# Patient Record
Sex: FEMALE | Race: WHITE | Employment: FULL TIME | URBAN - METROPOLITAN AREA
[De-identification: names, ages, dates, MRNs, and addresses within clinical notes are randomized per-mention and may not be internally consistent; named-entity substitution may affect disease eponyms.]

---

## 2017-10-29 ENCOUNTER — APPOINTMENT (OUTPATIENT)
Dept: ULTRASOUND IMAGING | Facility: HOSPITAL | Age: 62
DRG: 760 | End: 2017-10-29
Attending: EMERGENCY MEDICINE
Payer: COMMERCIAL

## 2017-10-29 ENCOUNTER — HOSPITAL ENCOUNTER (INPATIENT)
Facility: HOSPITAL | Age: 62
LOS: 3 days | Discharge: HOME | DRG: 760 | End: 2017-11-01
Attending: EMERGENCY MEDICINE | Admitting: HOSPITALIST
Payer: COMMERCIAL

## 2017-10-29 ENCOUNTER — APPOINTMENT (OUTPATIENT)
Dept: CT IMAGING | Facility: HOSPITAL | Age: 62
DRG: 760 | End: 2017-10-29
Attending: EMERGENCY MEDICINE
Payer: COMMERCIAL

## 2017-10-29 DIAGNOSIS — N83.8 OVARIAN MASS: Primary | ICD-10-CM

## 2017-10-29 DIAGNOSIS — R10.30 LOWER ABDOMINAL PAIN: ICD-10-CM

## 2017-10-29 PROCEDURE — 85025 COMPLETE CBC W/AUTO DIFF WBC: CPT | Performed by: EMERGENCY MEDICINE

## 2017-10-29 PROCEDURE — 85025 COMPLETE CBC W/AUTO DIFF WBC: CPT

## 2017-10-29 PROCEDURE — 76856 US EXAM PELVIC COMPLETE: CPT | Performed by: EMERGENCY MEDICINE

## 2017-10-29 PROCEDURE — 96374 THER/PROPH/DIAG INJ IV PUSH: CPT

## 2017-10-29 PROCEDURE — 74177 CT ABD & PELVIS W/CONTRAST: CPT | Performed by: EMERGENCY MEDICINE

## 2017-10-29 PROCEDURE — 80053 COMPREHEN METABOLIC PANEL: CPT

## 2017-10-29 PROCEDURE — 76830 TRANSVAGINAL US NON-OB: CPT | Performed by: EMERGENCY MEDICINE

## 2017-10-29 PROCEDURE — 93976 VASCULAR STUDY: CPT | Performed by: EMERGENCY MEDICINE

## 2017-10-29 PROCEDURE — 99285 EMERGENCY DEPT VISIT HI MDM: CPT

## 2017-10-29 PROCEDURE — 80053 COMPREHEN METABOLIC PANEL: CPT | Performed by: EMERGENCY MEDICINE

## 2017-10-29 RX ORDER — ACETAMINOPHEN 325 MG/1
650 TABLET ORAL EVERY 4 HOURS PRN
Status: DISCONTINUED | OUTPATIENT
Start: 2017-10-29 | End: 2017-11-01

## 2017-10-29 RX ORDER — HYDROCODONE BITARTRATE AND ACETAMINOPHEN 5; 325 MG/1; MG/1
1 TABLET ORAL EVERY 4 HOURS PRN
Status: DISCONTINUED | OUTPATIENT
Start: 2017-10-29 | End: 2017-11-01

## 2017-10-29 RX ORDER — MORPHINE SULFATE 4 MG/ML
4 INJECTION, SOLUTION INTRAMUSCULAR; INTRAVENOUS ONCE
Status: COMPLETED | OUTPATIENT
Start: 2017-10-29 | End: 2017-10-29

## 2017-10-29 RX ORDER — ENOXAPARIN SODIUM 100 MG/ML
40 INJECTION SUBCUTANEOUS DAILY
Status: DISCONTINUED | OUTPATIENT
Start: 2017-10-29 | End: 2017-11-01

## 2017-10-29 RX ORDER — SODIUM CHLORIDE 0.9 % (FLUSH) 0.9 %
3 SYRINGE (ML) INJECTION AS NEEDED
Status: DISCONTINUED | OUTPATIENT
Start: 2017-10-29 | End: 2017-11-01

## 2017-10-29 RX ORDER — MORPHINE SULFATE 4 MG/ML
INJECTION, SOLUTION INTRAMUSCULAR; INTRAVENOUS
Status: COMPLETED
Start: 2017-10-29 | End: 2017-10-29

## 2017-10-29 RX ORDER — HYDROCODONE BITARTRATE AND ACETAMINOPHEN 5; 325 MG/1; MG/1
2 TABLET ORAL EVERY 4 HOURS PRN
Status: DISCONTINUED | OUTPATIENT
Start: 2017-10-29 | End: 2017-11-01

## 2017-10-29 RX ORDER — ONDANSETRON 2 MG/ML
4 INJECTION INTRAMUSCULAR; INTRAVENOUS EVERY 6 HOURS PRN
Status: DISCONTINUED | OUTPATIENT
Start: 2017-10-29 | End: 2017-11-01

## 2017-10-30 ENCOUNTER — APPOINTMENT (OUTPATIENT)
Dept: MAMMOGRAPHY | Facility: HOSPITAL | Age: 62
DRG: 760 | End: 2017-10-30
Attending: OBSTETRICS & GYNECOLOGY
Payer: COMMERCIAL

## 2017-10-30 PROCEDURE — 80053 COMPREHEN METABOLIC PANEL: CPT | Performed by: HOSPITALIST

## 2017-10-30 PROCEDURE — 87086 URINE CULTURE/COLONY COUNT: CPT | Performed by: NURSE PRACTITIONER

## 2017-10-30 PROCEDURE — 84132 ASSAY OF SERUM POTASSIUM: CPT | Performed by: HOSPITALIST

## 2017-10-30 PROCEDURE — 87186 SC STD MICRODIL/AGAR DIL: CPT | Performed by: NURSE PRACTITIONER

## 2017-10-30 PROCEDURE — 88175 CYTOPATH C/V AUTO FLUID REDO: CPT | Performed by: OBSTETRICS & GYNECOLOGY

## 2017-10-30 PROCEDURE — 85025 COMPLETE CBC W/AUTO DIFF WBC: CPT | Performed by: HOSPITALIST

## 2017-10-30 PROCEDURE — 83036 HEMOGLOBIN GLYCOSYLATED A1C: CPT | Performed by: NURSE PRACTITIONER

## 2017-10-30 PROCEDURE — 87507 IADNA-DNA/RNA PROBE TQ 12-25: CPT | Performed by: NURSE PRACTITIONER

## 2017-10-30 PROCEDURE — 87624 HPV HI-RISK TYP POOLED RSLT: CPT | Performed by: OBSTETRICS & GYNECOLOGY

## 2017-10-30 PROCEDURE — 84466 ASSAY OF TRANSFERRIN: CPT | Performed by: NURSE PRACTITIONER

## 2017-10-30 PROCEDURE — 81001 URINALYSIS AUTO W/SCOPE: CPT | Performed by: NURSE PRACTITIONER

## 2017-10-30 PROCEDURE — 82272 OCCULT BLD FECES 1-3 TESTS: CPT | Performed by: NURSE PRACTITIONER

## 2017-10-30 PROCEDURE — 86304 IMMUNOASSAY TUMOR CA 125: CPT | Performed by: OBSTETRICS & GYNECOLOGY

## 2017-10-30 PROCEDURE — 83540 ASSAY OF IRON: CPT | Performed by: NURSE PRACTITIONER

## 2017-10-30 PROCEDURE — 77066 DX MAMMO INCL CAD BI: CPT | Performed by: OBSTETRICS & GYNECOLOGY

## 2017-10-30 PROCEDURE — 87077 CULTURE AEROBIC IDENTIFY: CPT | Performed by: NURSE PRACTITIONER

## 2017-10-30 PROCEDURE — BH02ZZZ PLAIN RADIOGRAPHY OF BILATERAL BREASTS: ICD-10-PCS | Performed by: RADIOLOGY

## 2017-10-30 RX ORDER — POTASSIUM CHLORIDE 20 MEQ/1
40 TABLET, EXTENDED RELEASE ORAL EVERY 4 HOURS
Status: DISPENSED | OUTPATIENT
Start: 2017-10-30 | End: 2017-10-30

## 2017-10-30 RX ORDER — SUMATRIPTAN 25 MG/1
50 TABLET, FILM COATED ORAL EVERY 2 HOUR PRN
Status: DISCONTINUED | OUTPATIENT
Start: 2017-10-30 | End: 2017-11-01

## 2017-10-30 RX ORDER — MELATONIN
325 2 TIMES DAILY WITH MEALS
Status: DISCONTINUED | OUTPATIENT
Start: 2017-10-30 | End: 2017-11-01

## 2017-10-30 RX ORDER — SODIUM CHLORIDE 9 MG/ML
INJECTION, SOLUTION INTRAVENOUS
Status: COMPLETED
Start: 2017-10-30 | End: 2017-10-30

## 2017-10-30 NOTE — ED PROVIDER NOTES
Patient Seen in: [facility] AND [facility] Emergency Department    History   Patient presents with:  Abdomen/Flank Pain (GI/)    Stated Complaint: Lower abdominal pain for 2 days    HPI    Patient is a 79-year-old female who presents to the emergency departmen Abdominal: Normal appearance. She exhibits no fluid wave and no ascites. There is tenderness in the suprapubic area and left lower quadrant. There is guarding. There is no rigidity, no rebound and no CVA tenderness.        Musculoskeletal: Normal range of m Urine Culture >100,000 CFU/ML Klebsiella pneumoniae (*)     All other components within normal limits   CBC W/ DIFFERENTIAL - Abnormal; Notable for the following:     HGB 11.8 (*)     HCT 34.6 (*)     Neutrophil Absolute 9.3 (*)     Lymphocyte Absolute 0. Procedure                               Abnormality         Status                     ---------                               -----------         ------                     CBC W/ DIFFERENTIAL[[id number]]          Abnormal            Final result There are no discharge medications for this patient.       Present on Admission           ICD-10-CM Noted POA    * (Principal)Ovarian mass N83.9 10/29/2017 Unknown    Lower abdominal pain R10.30 10/29/2017

## 2017-10-30 NOTE — H&P
[facility] Hospitalist Team  History and Physical     ASSESSMENT / PLAN:   63 yo female with hx migraines who presents with  abdominal pain and dark stools.   CT with  Pelvic mass, US with 11 cm right mass, ob/gyn and gyn/onc eval. GI consulted with dark stools, a migraines who presents with  abdominal pain and dark stools. CT with  Pelvic mass, US with 11 cm right mass, ob/gyn and gyn/onc eval. GI consulted with dark stools, anemia and ? Colitis on CT    Pt has not seen a doctor in [place]".  She noted predominate HGB  11.8*  11.3*   MCV  92.6  92.5   PLT  211  182       Recent Labs   Lab  10/29/17   1608  10/30/17   0537   NA  135*  135*   K  3.7  3.4   CL  102  103   CO2  24  26   BUN  10  8   CREATSERUM  0.66  0.74   GLU  131*  119*   CA  8.9  8.6       Recent report was submitted and there is agreement without major discrepancies. SEE ATTENDING NOTE BELOW      Patient seen and examined independently. Discussed with APN and agree with note above.       HPI : Ms. Owen Weiner is a 63 y/o F with a hx of migr infection. Redundant transverse colon with a moderate amount of stool present. Unremarkable right colon and appendix.  Wall thickening of the mid to distal descending and sigmoid colon, nonspecific and may reflect underdistention although I can't entirely

## 2017-10-30 NOTE — CONSULTS
61 y/o ,  over 10 years, no HRT admitted with abdominal pain from ER. Pt reports diffuse abd pain and cramping for last 2 days, black, tarry  stool for 2 days. No fever, N, V. No vag bleeding. Reports mild urinary frequency for 6 months.   CT :

## 2017-10-30 NOTE — CONSULTS
[place] [facility] - [facility]    Report of Consultation    Stephany Manasa Patient Status:  Inpatient    1955 MRN [id number]   Location [facility] 4W/SW/SE Attending Thea Barr, [address] [facility] Day # 1 PCP None Pcp     Reason for Consultation:  Ab injection 4 mg, 4 mg, Intravenous, Q6H PRN    Review of Systems: 10 point ROS discussed.  Positives include that which is stated in HPI    Physical Exam:    /59 (BP Location: Right arm)   Pulse 83   Temp 98.8 °F (37.1 °C) (Oral)   Resp 18   Ht 5' 6\" Only)(cpt=74177)    Result Date: 10/29/2017  CONCLUSION:  1. Large cystic pelvic mass of probable ovarian origin. Unremarkable uterus. Impossible to distinguish right versus left ovary based on this study.  Recommend followup pelvic ultrasound/transvaginal loss.           Trend Hgb           Gyne consult           Check CA -125  Adam Biggs MD  10/30/2017  11:19 AM

## 2017-10-31 ENCOUNTER — SURGERY (OUTPATIENT)
Age: 62
End: 2017-10-31

## 2017-10-31 PROCEDURE — 82728 ASSAY OF FERRITIN: CPT | Performed by: NURSE PRACTITIONER

## 2017-10-31 PROCEDURE — 80048 BASIC METABOLIC PNL TOTAL CA: CPT | Performed by: NURSE PRACTITIONER

## 2017-10-31 PROCEDURE — 93005 ELECTROCARDIOGRAM TRACING: CPT

## 2017-10-31 PROCEDURE — 85025 COMPLETE CBC W/AUTO DIFF WBC: CPT | Performed by: NURSE PRACTITIONER

## 2017-10-31 PROCEDURE — 93010 ELECTROCARDIOGRAM REPORT: CPT | Performed by: NURSE PRACTITIONER

## 2017-10-31 PROCEDURE — 0DJD8ZZ INSPECTION OF LOWER INTESTINAL TRACT, VIA NATURAL OR ARTIFICIAL OPENING ENDOSCOPIC: ICD-10-PCS | Performed by: INTERNAL MEDICINE

## 2017-10-31 RX ORDER — MIDAZOLAM HYDROCHLORIDE 1 MG/ML
INJECTION INTRAMUSCULAR; INTRAVENOUS
Status: DISCONTINUED | OUTPATIENT
Start: 2017-10-31 | End: 2017-10-31 | Stop reason: HOSPADM

## 2017-10-31 NOTE — PROGRESS NOTES
Gil Schultz  [id number]  October 31, 2017  7:24 AM      GYN Progress Note    Subjective: Pain mild, no need for meds since admit  Objective:  BP 94/50 (BP Location: Right arm)   Pulse 86   Temp 98.8 °F (37.1 °C) (Oral)   Resp 18   Ht 5' 6\" (1.676 m)

## 2017-10-31 NOTE — PROGRESS NOTES
[facility] Hospitalist Team  Progress Note    Hernandez Baidevon Patient Status:  Inpatient    1955 MRN [id number]   Location [facility] 4W/SW/SE Attending Stefan Mo, DO   Hosp Day # 2 PCP None Pcp     CC: Follow Up  PCP: None Pcp    ASSESSMENT/PL [phone]  10/31/2017    SUBJECTIVE:   Groggy from colonoscopy. Wants to eat      OBJECTIVE:   Blood pressure 103/63, pulse 86, temperature 97.9 °F (36.6 °C), temperature source Oral, resp.  rate 18, height 167.6 cm (5' 6\"), weight 155 lb 3.3 oz (70.4 k IMAGING     Ct Abdomen+pelvis(contrast Only)(cpt=74177)    Result Date: 10/29/2017  CONCLUSION:  1. Large cystic pelvic mass of probable ovarian origin. Unremarkable uterus. Impossible to distinguish right versus left ovary based on this study.  Rec SUSPICIOUS PALPABLE LUMP SHOULD BE BIOPSIED. For patients over the age of 36, the target due date for the patient's next mammogram has been entered into a reminder system.    Patient received a discharge summary from the technologist after completion of e sulfate  -appreciate GI input     Klebsiella UTI  -UA abnl, culture with klebsiella,sensitivities pending  -ceftriaxone D2      Elevated BS  -A1C 5.5     Migraines  -prn tylenol and imitrex  -will follow     HM  -pap with HPV negative  -mammogram negative

## 2017-10-31 NOTE — OPERATIVE REPORT
[facility] - [facility]    Colonoscopy Report      Laura Cochran Patient Status:  Inpatient    1955 MRN [id number]   Location [facility] ENDOSCOPY LAB SUITES Attending Hardeep Bridges DO       DATE OF OPERATION:  10/31/2017     REFERRING

## 2017-10-31 NOTE — PRE-SEDATION ASSESSMENT
Physician Pre-Sedation Assessment    Pre-Sedation Assessment:    Sedation History: None    Cardiac: normal S1, S2  Respiratory: breath sounds clear bilaterally   Abdomen: soft, BS (+), non-tender    ASA Classification: II    Plan: IV Sedation

## 2017-10-31 NOTE — H&P
[address] Patient Status:  Inpatient    1955 MRN [id number]   [facility] ENDOSCOPY LAB SUITES Attending Al Gandara, [address] [facility] Day # 2 PCP None Pcp     Date:  10/31/2017  D

## 2017-11-01 VITALS
HEIGHT: 66 IN | SYSTOLIC BLOOD PRESSURE: 109 MMHG | BODY MASS INDEX: 24.94 KG/M2 | DIASTOLIC BLOOD PRESSURE: 59 MMHG | WEIGHT: 155.19 LBS | HEART RATE: 78 BPM | TEMPERATURE: 99 F | RESPIRATION RATE: 18 BRPM | OXYGEN SATURATION: 95 %

## 2017-11-01 PROBLEM — R10.30 LOWER ABDOMINAL PAIN: Status: RESOLVED | Noted: 2017-10-29 | Resolved: 2017-11-01

## 2017-11-01 RX ORDER — MELATONIN
325 2 TIMES DAILY WITH MEALS
Qty: 60 TABLET | Refills: 0 | Status: SHIPPED | OUTPATIENT
Start: 2017-11-01 | End: 2021-11-12

## 2017-11-01 RX ORDER — ASPIRIN 81 MG
100 TABLET, DELAYED RELEASE (ENTERIC COATED) ORAL 2 TIMES DAILY
Qty: 60 TABLET | Refills: 0 | Status: SHIPPED | OUTPATIENT
Start: 2017-11-01 | End: 2021-11-12

## 2017-11-01 RX ORDER — ACETAMINOPHEN 325 MG/1
650 TABLET ORAL EVERY 4 HOURS PRN
Qty: 30 TABLET | Refills: 0 | Status: SHIPPED | OUTPATIENT
Start: 2017-11-01 | End: 2021-11-12

## 2017-11-01 NOTE — PROGRESS NOTES
GYN Note    Pt not using any pain meds, though has not walked in halls. GYN-ONC not avail until at least Sat for surg, so pt may be discharged.   I explained surg procedure to pt, necessary time off work post-op, etc.  Lives with husb who can assist her

## 2017-11-01 NOTE — DISCHARGE SUMMARY
[facility] Hospitalist Discharge Summary   Patient ID:  Bre Perez  [id number]  27 year old  12/4/1955    Admit date: 10/29/2017  Discharge date: 11/1/2017  Risk of Readmission Lace+ Score: 17  59-90 High Risk  29-58 Medium Risk  0-28   Low Risk    Primary C will schedule with Dr Satnam Sterling [facility] Gyn/Onc)  -pre op clearance- denies CP or SOB. Able to perform >4 mets of exercise.  EKG pending, if normal no contraindications to OR       Anemia/Black Stools  -hgb on admit 11.8-->11.6  -iron 22, iron sat 9 transferri underdistention although I can't entirely exclude  colitis. Given history of black stools, consider further evaluation/assessment. No evidence of acute diverticulitis based on this study; no perforation, free air or abscess.          Us Pelvis Ev (trns Abd call you for surgery date    Disposition: home  Discharged Condition: good      Additional patient instructions:  Dr Luna Osei and Dr Barber Nails to do operation.  Dr Luna Osei office to call you for surgery date     Establish with Dr Blanca Stubbs as primary after operation

## 2017-11-01 NOTE — DISCHARGE SUMMARY
[facility] Hospitalist Discharge Summary   Patient ID:  Smith Claude  [id number]  50 year old  12/4/1955    Admit date: 10/29/2017  Discharge date: 11/1/2017  Risk of Readmission Lace+ Score: 17  59-90 High Risk  29-58 Medium Risk  0-28   Low Risk    Primary C for surgery Saturday or next week, OB will schedule with Dr Marc Dias [facility] Gyn/Onc). OB office to call pt with date/time  -pre op clearance- denies CP or SOB. Able to perform >4 mets of exercise.  EKG pending, if normal no contraindications to OR     Anemia descending and sigmoid colon, nonspecific and may reflect underdistention although I can't entirely exclude  colitis. Given history of black stools, consider further evaluation/assessment.  No evidence of acute diverticulitis based on this study; no perfora surgery  Dr Claudine Garcia and Dr Michael Malloy to do operation. Dr Claudine Garcia office to call you for surgery date    Disposition: home  Discharged Condition: good      Additional patient instructions:  Dr Claudine Garcia and Dr Michael Malloy to do operation.  Dr Claudine Garcia office to call you for surger

## 2017-11-04 RX ORDER — OMEGA-3 FATTY ACIDS/FISH OIL 300-1000MG
400 CAPSULE ORAL 4 TIMES DAILY PRN
COMMUNITY
End: 2021-11-12

## 2017-11-04 RX ORDER — SUMATRIPTAN 50 MG/1
50 TABLET, FILM COATED ORAL EVERY 2 HOUR PRN
COMMUNITY
End: 2021-11-12

## 2017-11-09 ENCOUNTER — LAB ENCOUNTER (OUTPATIENT)
Dept: LAB | Age: 62
End: 2017-11-09
Attending: OBSTETRICS & GYNECOLOGY
Payer: COMMERCIAL

## 2017-11-09 DIAGNOSIS — Z01.818 PREOPERATIVE TESTING: ICD-10-CM

## 2017-11-09 PROCEDURE — 86901 BLOOD TYPING SEROLOGIC RH(D): CPT

## 2017-11-09 PROCEDURE — 86900 BLOOD TYPING SEROLOGIC ABO: CPT

## 2017-11-09 PROCEDURE — 36415 COLL VENOUS BLD VENIPUNCTURE: CPT

## 2017-11-09 PROCEDURE — 86850 RBC ANTIBODY SCREEN: CPT

## 2017-11-10 ENCOUNTER — ANESTHESIA (OUTPATIENT)
Dept: SURGERY | Facility: HOSPITAL | Age: 62
DRG: 743 | End: 2017-11-10
Payer: COMMERCIAL

## 2017-11-10 ENCOUNTER — ANESTHESIA EVENT (OUTPATIENT)
Dept: SURGERY | Facility: HOSPITAL | Age: 62
DRG: 743 | End: 2017-11-10
Payer: COMMERCIAL

## 2017-11-10 ENCOUNTER — HOSPITAL ENCOUNTER (INPATIENT)
Facility: HOSPITAL | Age: 62
LOS: 3 days | Discharge: HOME | DRG: 743 | End: 2017-11-13
Attending: OBSTETRICS & GYNECOLOGY | Admitting: OBSTETRICS & GYNECOLOGY
Payer: COMMERCIAL

## 2017-11-10 ENCOUNTER — SURGERY (OUTPATIENT)
Age: 62
End: 2017-11-10

## 2017-11-10 DIAGNOSIS — Z01.818 PREOPERATIVE TESTING: Primary | ICD-10-CM

## 2017-11-10 PROBLEM — Z90.710 S/P ABDOMINAL HYSTERECTOMY: Status: ACTIVE | Noted: 2017-11-10

## 2017-11-10 PROCEDURE — 0UT20ZZ RESECTION OF BILATERAL OVARIES, OPEN APPROACH: ICD-10-PCS | Performed by: OBSTETRICS & GYNECOLOGY

## 2017-11-10 PROCEDURE — 0DNW0ZZ RELEASE PERITONEUM, OPEN APPROACH: ICD-10-PCS | Performed by: OBSTETRICS & GYNECOLOGY

## 2017-11-10 PROCEDURE — 0UT90ZZ RESECTION OF UTERUS, OPEN APPROACH: ICD-10-PCS | Performed by: OBSTETRICS & GYNECOLOGY

## 2017-11-10 PROCEDURE — 88112 CYTOPATH CELL ENHANCE TECH: CPT | Performed by: OBSTETRICS & GYNECOLOGY

## 2017-11-10 PROCEDURE — 88305 TISSUE EXAM BY PATHOLOGIST: CPT | Performed by: OBSTETRICS & GYNECOLOGY

## 2017-11-10 PROCEDURE — 0UT70ZZ RESECTION OF BILATERAL FALLOPIAN TUBES, OPEN APPROACH: ICD-10-PCS | Performed by: OBSTETRICS & GYNECOLOGY

## 2017-11-10 PROCEDURE — 88307 TISSUE EXAM BY PATHOLOGIST: CPT | Performed by: OBSTETRICS & GYNECOLOGY

## 2017-11-10 PROCEDURE — 88332 PATH CONSLTJ SURG EA ADD BLK: CPT | Performed by: OBSTETRICS & GYNECOLOGY

## 2017-11-10 PROCEDURE — 0UB00ZZ EXCISION OF RIGHT OVARY, OPEN APPROACH: ICD-10-PCS | Performed by: OBSTETRICS & GYNECOLOGY

## 2017-11-10 PROCEDURE — 88331 PATH CONSLTJ SURG 1 BLK 1SPC: CPT | Performed by: OBSTETRICS & GYNECOLOGY

## 2017-11-10 RX ORDER — HYDROCODONE BITARTRATE AND ACETAMINOPHEN 5; 325 MG/1; MG/1
2 TABLET ORAL AS NEEDED
Status: DISCONTINUED | OUTPATIENT
Start: 2017-11-10 | End: 2017-11-11

## 2017-11-10 RX ORDER — ONDANSETRON 2 MG/ML
INJECTION INTRAMUSCULAR; INTRAVENOUS AS NEEDED
Status: DISCONTINUED | OUTPATIENT
Start: 2017-11-10 | End: 2017-11-10 | Stop reason: SURG

## 2017-11-10 RX ORDER — HYDROMORPHONE HYDROCHLORIDE 1 MG/ML
0.6 INJECTION, SOLUTION INTRAMUSCULAR; INTRAVENOUS; SUBCUTANEOUS EVERY 5 MIN PRN
Status: DISCONTINUED | OUTPATIENT
Start: 2017-11-10 | End: 2017-11-11

## 2017-11-10 RX ORDER — NALOXONE HYDROCHLORIDE 0.4 MG/ML
0.08 INJECTION, SOLUTION INTRAMUSCULAR; INTRAVENOUS; SUBCUTANEOUS
Status: DISCONTINUED | OUTPATIENT
Start: 2017-11-10 | End: 2017-11-12

## 2017-11-10 RX ORDER — FAMOTIDINE 20 MG/1
20 TABLET ORAL ONCE
Status: DISCONTINUED | OUTPATIENT
Start: 2017-11-10 | End: 2017-11-10 | Stop reason: HOSPADM

## 2017-11-10 RX ORDER — ONDANSETRON 2 MG/ML
4 INJECTION INTRAMUSCULAR; INTRAVENOUS ONCE AS NEEDED
Status: ACTIVE | OUTPATIENT
Start: 2017-11-10 | End: 2017-11-10

## 2017-11-10 RX ORDER — METOCLOPRAMIDE 10 MG/1
10 TABLET ORAL ONCE
Status: DISCONTINUED | OUTPATIENT
Start: 2017-11-10 | End: 2017-11-10 | Stop reason: HOSPADM

## 2017-11-10 RX ORDER — DEXTROSE, SODIUM CHLORIDE, SODIUM LACTATE, POTASSIUM CHLORIDE, AND CALCIUM CHLORIDE 5; .6; .31; .03; .02 G/100ML; G/100ML; G/100ML; G/100ML; G/100ML
INJECTION, SOLUTION INTRAVENOUS CONTINUOUS
Status: DISCONTINUED | OUTPATIENT
Start: 2017-11-10 | End: 2017-11-12

## 2017-11-10 RX ORDER — SODIUM CHLORIDE 9 MG/ML
INJECTION, SOLUTION INTRAVENOUS CONTINUOUS PRN
Status: DISCONTINUED | OUTPATIENT
Start: 2017-11-10 | End: 2017-11-10 | Stop reason: SURG

## 2017-11-10 RX ORDER — SODIUM CHLORIDE 0.9 % (FLUSH) 0.9 %
10 SYRINGE (ML) INJECTION AS NEEDED
Status: DISCONTINUED | OUTPATIENT
Start: 2017-11-10 | End: 2017-11-13

## 2017-11-10 RX ORDER — ROCURONIUM BROMIDE 10 MG/ML
INJECTION, SOLUTION INTRAVENOUS AS NEEDED
Status: DISCONTINUED | OUTPATIENT
Start: 2017-11-10 | End: 2017-11-10 | Stop reason: SURG

## 2017-11-10 RX ORDER — GLYCOPYRROLATE 0.2 MG/ML
INJECTION INTRAMUSCULAR; INTRAVENOUS AS NEEDED
Status: DISCONTINUED | OUTPATIENT
Start: 2017-11-10 | End: 2017-11-10 | Stop reason: SURG

## 2017-11-10 RX ORDER — HYDROCODONE BITARTRATE AND ACETAMINOPHEN 5; 325 MG/1; MG/1
1 TABLET ORAL AS NEEDED
Status: DISCONTINUED | OUTPATIENT
Start: 2017-11-10 | End: 2017-11-11

## 2017-11-10 RX ORDER — HALOPERIDOL 5 MG/ML
0.25 INJECTION INTRAMUSCULAR ONCE AS NEEDED
Status: ACTIVE | OUTPATIENT
Start: 2017-11-10 | End: 2017-11-10

## 2017-11-10 RX ORDER — ACETAMINOPHEN 325 MG/1
650 TABLET ORAL EVERY 4 HOURS PRN
Status: DISCONTINUED | OUTPATIENT
Start: 2017-11-10 | End: 2017-11-13

## 2017-11-10 RX ORDER — HYDROMORPHONE HYDROCHLORIDE 1 MG/ML
0.4 INJECTION, SOLUTION INTRAMUSCULAR; INTRAVENOUS; SUBCUTANEOUS EVERY 5 MIN PRN
Status: DISCONTINUED | OUTPATIENT
Start: 2017-11-10 | End: 2017-11-11

## 2017-11-10 RX ORDER — NALOXONE HYDROCHLORIDE 0.4 MG/ML
80 INJECTION, SOLUTION INTRAMUSCULAR; INTRAVENOUS; SUBCUTANEOUS AS NEEDED
Status: ACTIVE | OUTPATIENT
Start: 2017-11-10 | End: 2017-11-10

## 2017-11-10 RX ORDER — ONDANSETRON 2 MG/ML
4 INJECTION INTRAMUSCULAR; INTRAVENOUS EVERY 6 HOURS PRN
Status: DISCONTINUED | OUTPATIENT
Start: 2017-11-10 | End: 2017-11-13

## 2017-11-10 RX ORDER — HEPARIN SODIUM 5000 [USP'U]/ML
5000 INJECTION, SOLUTION INTRAVENOUS; SUBCUTANEOUS EVERY 8 HOURS SCHEDULED
Status: DISCONTINUED | OUTPATIENT
Start: 2017-11-11 | End: 2017-11-13

## 2017-11-10 RX ORDER — HYDROCODONE BITARTRATE AND ACETAMINOPHEN 5; 325 MG/1; MG/1
1 TABLET ORAL EVERY 4 HOURS PRN
Status: DISCONTINUED | OUTPATIENT
Start: 2017-11-10 | End: 2017-11-13

## 2017-11-10 RX ORDER — ONDANSETRON 4 MG/1
4 TABLET, FILM COATED ORAL EVERY 8 HOURS PRN
Status: DISCONTINUED | OUTPATIENT
Start: 2017-11-10 | End: 2017-11-13

## 2017-11-10 RX ORDER — LIDOCAINE HYDROCHLORIDE 10 MG/ML
INJECTION, SOLUTION EPIDURAL; INFILTRATION; INTRACAUDAL; PERINEURAL AS NEEDED
Status: DISCONTINUED | OUTPATIENT
Start: 2017-11-10 | End: 2017-11-10 | Stop reason: SURG

## 2017-11-10 RX ORDER — NEOSTIGMINE METHYLSULFATE 0.5 MG/ML
INJECTION INTRAVENOUS AS NEEDED
Status: DISCONTINUED | OUTPATIENT
Start: 2017-11-10 | End: 2017-11-10 | Stop reason: SURG

## 2017-11-10 RX ORDER — MORPHINE SULFATE 2 MG/ML
2 INJECTION, SOLUTION INTRAMUSCULAR; INTRAVENOUS EVERY 10 MIN PRN
Status: DISCONTINUED | OUTPATIENT
Start: 2017-11-10 | End: 2017-11-11

## 2017-11-10 RX ORDER — MORPHINE SULFATE 4 MG/ML
4 INJECTION, SOLUTION INTRAMUSCULAR; INTRAVENOUS EVERY 10 MIN PRN
Status: DISCONTINUED | OUTPATIENT
Start: 2017-11-10 | End: 2017-11-11

## 2017-11-10 RX ORDER — ONDANSETRON 2 MG/ML
4 INJECTION INTRAMUSCULAR; INTRAVENOUS EVERY 8 HOURS PRN
Status: DISCONTINUED | OUTPATIENT
Start: 2017-11-10 | End: 2017-11-13

## 2017-11-10 RX ORDER — MORPHINE SULFATE 10 MG/ML
6 INJECTION, SOLUTION INTRAMUSCULAR; INTRAVENOUS EVERY 10 MIN PRN
Status: DISCONTINUED | OUTPATIENT
Start: 2017-11-10 | End: 2017-11-11

## 2017-11-10 RX ORDER — MIDAZOLAM HYDROCHLORIDE 1 MG/ML
INJECTION INTRAMUSCULAR; INTRAVENOUS AS NEEDED
Status: DISCONTINUED | OUTPATIENT
Start: 2017-11-10 | End: 2017-11-10 | Stop reason: SURG

## 2017-11-10 RX ORDER — SUMATRIPTAN 25 MG/1
50 TABLET, FILM COATED ORAL EVERY 2 HOUR PRN
Status: DISCONTINUED | OUTPATIENT
Start: 2017-11-10 | End: 2017-11-13

## 2017-11-10 RX ORDER — HYDROMORPHONE HYDROCHLORIDE 1 MG/ML
0.2 INJECTION, SOLUTION INTRAMUSCULAR; INTRAVENOUS; SUBCUTANEOUS EVERY 5 MIN PRN
Status: DISCONTINUED | OUTPATIENT
Start: 2017-11-10 | End: 2017-11-11

## 2017-11-10 RX ORDER — SODIUM CHLORIDE, SODIUM LACTATE, POTASSIUM CHLORIDE, CALCIUM CHLORIDE 600; 310; 30; 20 MG/100ML; MG/100ML; MG/100ML; MG/100ML
INJECTION, SOLUTION INTRAVENOUS CONTINUOUS
Status: DISCONTINUED | OUTPATIENT
Start: 2017-11-10 | End: 2017-11-10

## 2017-11-10 RX ORDER — NALBUPHINE HCL 10 MG/ML
2.5 AMPUL (ML) INJECTION EVERY 4 HOURS PRN
Status: DISCONTINUED | OUTPATIENT
Start: 2017-11-10 | End: 2017-11-12

## 2017-11-10 RX ORDER — HYDROCODONE BITARTRATE AND ACETAMINOPHEN 5; 325 MG/1; MG/1
2 TABLET ORAL EVERY 4 HOURS PRN
Status: DISCONTINUED | OUTPATIENT
Start: 2017-11-10 | End: 2017-11-13

## 2017-11-10 RX ORDER — DEXAMETHASONE SODIUM PHOSPHATE 4 MG/ML
VIAL (ML) INJECTION AS NEEDED
Status: DISCONTINUED | OUTPATIENT
Start: 2017-11-10 | End: 2017-11-10 | Stop reason: SURG

## 2017-11-10 RX ORDER — ACETAMINOPHEN 500 MG
1000 TABLET ORAL ONCE
Status: DISCONTINUED | OUTPATIENT
Start: 2017-11-10 | End: 2017-11-10 | Stop reason: HOSPADM

## 2017-11-10 RX ORDER — SODIUM CHLORIDE, SODIUM LACTATE, POTASSIUM CHLORIDE, CALCIUM CHLORIDE 600; 310; 30; 20 MG/100ML; MG/100ML; MG/100ML; MG/100ML
INJECTION, SOLUTION INTRAVENOUS CONTINUOUS
Status: DISCONTINUED | OUTPATIENT
Start: 2017-11-10 | End: 2017-11-12

## 2017-11-10 RX ORDER — DIPHENHYDRAMINE HYDROCHLORIDE 50 MG/ML
12.5 INJECTION INTRAMUSCULAR; INTRAVENOUS EVERY 4 HOURS PRN
Status: DISCONTINUED | OUTPATIENT
Start: 2017-11-10 | End: 2017-11-12

## 2017-11-10 RX ORDER — HEPARIN SODIUM 5000 [USP'U]/ML
INJECTION, SOLUTION INTRAVENOUS; SUBCUTANEOUS AS NEEDED
Status: DISCONTINUED | OUTPATIENT
Start: 2017-11-10 | End: 2017-11-10 | Stop reason: SURG

## 2017-11-10 RX ADMIN — ROCURONIUM BROMIDE 50 MG: 10 INJECTION, SOLUTION INTRAVENOUS at 16:03:00

## 2017-11-10 RX ADMIN — GLYCOPYRROLATE 0.6 MG: 0.2 INJECTION INTRAMUSCULAR; INTRAVENOUS at 17:38:00

## 2017-11-10 RX ADMIN — NEOSTIGMINE METHYLSULFATE 3 MG: 0.5 INJECTION INTRAVENOUS at 17:38:00

## 2017-11-10 RX ADMIN — MIDAZOLAM HYDROCHLORIDE 2 MG: 1 INJECTION INTRAMUSCULAR; INTRAVENOUS at 16:03:00

## 2017-11-10 RX ADMIN — SODIUM CHLORIDE: 9 INJECTION, SOLUTION INTRAVENOUS at 16:07:00

## 2017-11-10 RX ADMIN — SODIUM CHLORIDE: 9 INJECTION, SOLUTION INTRAVENOUS at 17:50:00

## 2017-11-10 RX ADMIN — SODIUM CHLORIDE: 9 INJECTION, SOLUTION INTRAVENOUS at 17:00:00

## 2017-11-10 RX ADMIN — DEXAMETHASONE SODIUM PHOSPHATE 4 MG: 4 MG/ML VIAL (ML) INJECTION at 16:11:00

## 2017-11-10 RX ADMIN — HEPARIN SODIUM 5000 UNITS: 5000 INJECTION, SOLUTION INTRAVENOUS; SUBCUTANEOUS at 16:30:00

## 2017-11-10 RX ADMIN — SODIUM CHLORIDE, SODIUM LACTATE, POTASSIUM CHLORIDE, CALCIUM CHLORIDE: 600; 310; 30; 20 INJECTION, SOLUTION INTRAVENOUS at 16:02:00

## 2017-11-10 RX ADMIN — SODIUM CHLORIDE, SODIUM LACTATE, POTASSIUM CHLORIDE, CALCIUM CHLORIDE: 600; 310; 30; 20 INJECTION, SOLUTION INTRAVENOUS at 17:00:00

## 2017-11-10 RX ADMIN — ONDANSETRON 4 MG: 2 INJECTION INTRAMUSCULAR; INTRAVENOUS at 16:11:00

## 2017-11-10 RX ADMIN — SODIUM CHLORIDE, SODIUM LACTATE, POTASSIUM CHLORIDE, CALCIUM CHLORIDE: 600; 310; 30; 20 INJECTION, SOLUTION INTRAVENOUS at 17:50:00

## 2017-11-10 RX ADMIN — LIDOCAINE HYDROCHLORIDE 50 MG: 10 INJECTION, SOLUTION EPIDURAL; INFILTRATION; INTRACAUDAL; PERINEURAL at 16:03:00

## 2017-11-10 NOTE — BRIEF OP NOTE
Pre-Operative Diagnosis: ovarian mass     Post-Operative Diagnosis: ovarian mass     Procedure Performed:   Procedure(s):  exploratory laparotomy, total abdominal hysterectomy, bilateral salpingo-oophorectomy       Surgeon(s) and Role:  Panel 1:     * F

## 2017-11-10 NOTE — H&P
[facility]    PATIENT'S NAME: Earl GUERRA   ATTENDING PHYSICIAN: Kate Hdez.  MD Beba   PATIENT ACCOUNT#:   [id number]    LOCATION:  [facility]  MEDICAL RECORD #:   [id number]       YOB: 1955  ADMISSION DATE:       11/10 disease. A paternal grandmother had stomach cancer. SOCIAL HISTORY:  Patient is a nonsmoker. Drinks one alcoholic beverage per week. No illicit drug use. She is employed full time.     REVIEW OF SYSTEMS:  Positive only for what was in the History of

## 2017-11-10 NOTE — ANESTHESIA PREPROCEDURE EVALUATION
Anesthesia PreOp Note    HPI:     Laura Cochran is a 62year old female who presents for preoperative consultation requested by: Evangelina Schumacher MD    Date of Surgery: 11/10/2017    Procedure(s):  ABDOMINAL HYSTERECTOMY, BSO  ABDOMINAL HYSTERECTO Oral Once Cyrus Yoder MD   Metoclopramide HCl (REGLAN) tab 10 mg 10 mg Oral Once Cyrus Yoder MD   CeFAZolin Sodium (ANCEF/KEFZOL) IVPB premix 2 g 2 g Intravenous Once Hillary Trivedi MD     No current Epic-ordered outpatient 5.5\")        Anesthesia ROS/Med Hx and Physical Exam     Patient summary reviewed and Nursing notes reviewed    History of anesthetic complications (PONV)   Airway   Mallampati: I  TM distance: >3 FB  Neck ROM: full  Dental - normal exam     Pulmonary - n

## 2017-11-10 NOTE — ANESTHESIA POSTPROCEDURE EVALUATION
Patient: Joy Barrett    Procedure Summary     Date:  11/10/17 Room / Location:  [facility] OR 04 / [facility] OR    Anesthesia Start:  9941 Anesthesia Stop:  4019    Procedure:  ABDOMINAL HYSTERECTOMY, BSO (N/A ) Diagnosis:  (ovarian mass)    Surgeon:  Keesha Andrews

## 2017-11-10 NOTE — INTERVAL H&P NOTE
Pre-op Diagnosis: ovarian mass    The above referenced H&P was reviewed by Kristi Yoo MD on 11/10/2017, the patient was examined and no significant changes have occurred in the patient's condition since the H&P was performed.   I discussed with

## 2017-11-11 PROCEDURE — 85027 COMPLETE CBC AUTOMATED: CPT | Performed by: OBSTETRICS & GYNECOLOGY

## 2017-11-11 NOTE — OPERATIVE REPORT
[facility]    PATIENT'S NAME: Marylee Specter K   ATTENDING PHYSICIAN: Krupa Soria. MD Beba   OPERATING PHYSICIAN: Krupa Soria.  MD Beba   PATIENT ACCOUNT#:   [id number]    LOCATION:  [address] [place] [address] [facility] RECORD #:   [id number] dissected off the lower segment and cervix with sharp blunt dissection. The uterine vessels were skeletonized, clamped, cut, and suture ligated with 0 Vicryl suture bilaterally.   Several bites were taken along the cardinal ligament after repeatedly dissec

## 2017-11-11 NOTE — PROGRESS NOTES
POD#1    No major complaints    BP 97/50 (BP Location: Right arm)   Pulse 67   Temp 97.9 °F (36.6 °C) (Oral)   Resp 18   Ht 5' 5.5\" (1.664 m)   Wt 154 lb (69.9 kg)   SpO2 98%   BMI 25.24 kg/m²   Lungs clr  Cardiac nl  abd benign, BS positive    Recent Lab

## 2017-11-11 NOTE — OPERATIVE REPORT
[facility]    PATIENT'S NAME: Nic Kirk CHARLIE   ATTENDING PHYSICIAN: Edouard Rubio.  MD Beba   OPERATING PHYSICIAN: Kaylynn Covarrubias MD   PATIENT ACCOUNT#:   [id number]    LOCATION:  [facility] [address] [facility] RECORD #:   [id number]       DATE [facility] ileum was sharply dissected off the posterior surface of this mass; this was very inflammatory and had adhesions.   Once this was done, the sigmoid colon mesentery could be  from the mass itself, and then the mass could be delivered up into the inc parasitic perforators.      Dictated By Andrés Patterson MD  d: 11/10/2017 17:55:35  t: 11/11/2017 05:16:11  Job [id number]  [facility]/

## 2017-11-12 PROBLEM — D64.9 ANEMIA: Status: ACTIVE | Noted: 2017-11-12

## 2017-11-12 PROCEDURE — 85014 HEMATOCRIT: CPT | Performed by: OBSTETRICS & GYNECOLOGY

## 2017-11-12 PROCEDURE — 85025 COMPLETE CBC W/AUTO DIFF WBC: CPT | Performed by: OBSTETRICS & GYNECOLOGY

## 2017-11-12 PROCEDURE — 85018 HEMOGLOBIN: CPT | Performed by: OBSTETRICS & GYNECOLOGY

## 2017-11-12 PROCEDURE — 80048 BASIC METABOLIC PNL TOTAL CA: CPT | Performed by: OBSTETRICS & GYNECOLOGY

## 2017-11-12 RX ORDER — IBUPROFEN 600 MG/1
600 TABLET ORAL EVERY 6 HOURS PRN
Status: DISCONTINUED | OUTPATIENT
Start: 2017-11-12 | End: 2017-11-12

## 2017-11-12 RX ORDER — MELATONIN
325
Status: DISCONTINUED | OUTPATIENT
Start: 2017-11-12 | End: 2017-11-13

## 2017-11-12 RX ORDER — IBUPROFEN 600 MG/1
600 TABLET ORAL EVERY 6 HOURS PRN
Status: DISCONTINUED | OUTPATIENT
Start: 2017-11-12 | End: 2017-11-13

## 2017-11-12 RX ORDER — DOCUSATE SODIUM 100 MG/1
100 CAPSULE, LIQUID FILLED ORAL 2 TIMES DAILY
Status: DISCONTINUED | OUTPATIENT
Start: 2017-11-12 | End: 2017-11-13

## 2017-11-12 NOTE — PROGRESS NOTES
[place] [facility] - [facility]    OB/GYNE Progress Note      Orval Coffee Patient Status:  Inpatient    1955 MRN [id number]   Location [facility] 4W/SW/SE Attending Fifi Solorio, *   Hosp Day # 2 PCP Norberto Connors MD       Assessme admission    Results:     Lab Results  Component Value Date   ABO O 11/09/2017   RH Positive 11/09/2017   WBC 7.7 11/12/2017   HGB 7.5 (L) 11/12/2017   HCT 22.5 (L) 11/12/2017    11/12/2017   CREATSERUM 0.61 11/12/2017   BUN 3 (L) 11/12/2017   NA 13

## 2017-11-12 NOTE — PLAN OF CARE
Maintains or returns to baseline bowel function Not Progressing    She has been advanced to Gen diet but able to only take in liquids today, no flatus, unable to walk much due to dizziness.   Minimal or absence of nausea and vomiting Progressing      [place]

## 2017-11-12 NOTE — PLAN OF CARE
Minimal or absence of nausea and vomiting Progressing      Maintains or returns to baseline bowel function Progressing      Maintains adequate nutritional intake (undernourished) Progressing      Verbalizes/displays adequate comfort level or patient's stat

## 2017-11-13 VITALS
BODY MASS INDEX: 25.35 KG/M2 | WEIGHT: 154 LBS | OXYGEN SATURATION: 100 % | HEART RATE: 74 BPM | SYSTOLIC BLOOD PRESSURE: 114 MMHG | RESPIRATION RATE: 18 BRPM | TEMPERATURE: 98 F | DIASTOLIC BLOOD PRESSURE: 89 MMHG | HEIGHT: 65.5 IN

## 2017-11-13 PROBLEM — Z90.710 S/P ABDOMINAL HYSTERECTOMY: Status: RESOLVED | Noted: 2017-11-10 | Resolved: 2017-11-13

## 2017-11-13 PROBLEM — N83.8 OVARIAN MASS: Status: RESOLVED | Noted: 2017-11-13 | Resolved: 2017-11-13

## 2017-11-13 PROBLEM — N83.8 OVARIAN MASS: Status: ACTIVE | Noted: 2017-11-13

## 2017-11-13 PROBLEM — Z01.818 PREOPERATIVE TESTING: Status: RESOLVED | Noted: 2017-11-10 | Resolved: 2017-11-13

## 2017-11-13 RX ORDER — BISACODYL 10 MG
10 SUPPOSITORY, RECTAL RECTAL
Status: DISCONTINUED | OUTPATIENT
Start: 2017-11-13 | End: 2017-11-13

## 2017-11-13 NOTE — PROGRESS NOTES
GYN P-Note  POD 3    No flatus or BM yet; not feeling cramps; tolerating genl diet, no nausea; voiding nl; no vag bleeding; walking in halls    AVSS  Cor: RRR w/o murmur  Abd: soft, active BS, incision C,D,I  Legs: nontender, neg Yeni, SCDs on    Rpt Hb y

## 2017-11-13 NOTE — DISCHARGE SUMMARY
[place] [facility] - [facility]    Discharge Summary    Paulino Perea Patient Status:  Inpatient    1955 MRN [id number]   Location [facility] 4W/SW/SE Attending Beba [address] Day # 3 PCP Homero Collier MD     Date of Admiss

## 2017-11-13 NOTE — OPERATIVE REPORT
[facility]    PATIENT'S NAME: Mayank GUERRA   ATTENDING PHYSICIAN: Smiley Busch. MD Beba   OPERATING PHYSICIAN: Smiley Busch.  MD Beba   PATIENT ACCOUNT#:   [id number]    LOCATION:  [address] [place], [place] [address] [address] [facility],[address] RECORD #:   [id number] uterine segment. The bladder was dissected off the lower segment and cervix with sharp blunt dissection. The uterine vessels were skeletonized, clamped, cut, and suture ligated with 0 Vicryl suture bilaterally.   Several bites were taken along the cardina 17:58:30  t: 11/13/2017 08:09:58  Job [id number]  [facility]/

## 2017-11-14 NOTE — PAYOR COMM NOTE
--------------  ADMISSION REVIEW     Payor: DANGELO VIRGEN  Subscriber #:  [id number]  Authorization Number: [id number]    Admit date: 10/29/17  Admit time: 4547       Patient Seen in: [facility] Emergency Department    History   Patient presents with: noted.   Nursing note and vitals reviewed. [IS.3]      Labs Reviewed   COMP METABOLIC PANEL (14) - Abnormal; Notable for the following:        Result Value    Glucose 131 (*)     Sodium 135 (*)     Alkaline Phosphatase 114 (*)     Bilirubin, Total 2.4 (*) other components within normal limits   CBC W/ DIFFERENTIAL - Abnormal; Notable for the following:     RBC 3.65 (*)     HGB 11.4 (*)     HCT 34.1 (*)     Neutrophil Absolute 8.7 (*)     Lymphocyte Absolute 0.9 (*)      --------------  CT reveals large pelv coarse      HISTORY:   CC: Patient presents with:  Abdomen/Flank Pain (GI/)        PCP: None Pcp     History of Present Illness:      63 yo female with hx migraines who presents with  abdominal pain and dark stools.   CT with  Pelvic mass, US with 11 cm r systems was completed.   Pertinent positives and negatives noted in the the HPI.        DIAGNOSTIC DATA:   CBC/Chem       Recent Labs   Lab  10/29/17   1608  10/30/17   0537   WBC  10.9  9.8   HGB  11.8*  11.3*   MCV  92.6  92.5   PLT  211  182        And Endovag) (BOM=[id number])     Result Date: 10/30/2017  CONCLUSION:  1. Atrophic uterus. 2. 11.56 cm complex right ovarian/adnexal mass probably neoplasm. 3. Left ovary not visualized.  4. A preliminary report was submitted and there is agreement withou LLQ  EXTREMITIES: no edema, no calf tenderness     DIAGNOSTIC DATA:   Labs:            Recent Labs   Lab  10/29/17   1608  10/30/17   0537  10/31/17   0613   WBC  10.9  9.8  10.6   HGB  11.8*  11.3*  11.4*   MCV  92.6  92.5  93.7   PLT  211  182  235

## 2017-11-17 NOTE — PAYOR COMM NOTE
[facility]     PATIENT'S NAME: Eller Collet K   ATTENDING PHYSICIAN: Zeyad Davis.  MD Beba   PATIENT ACCOUNT#:   [id number]    [facility]  MEDICAL RECORD #:   [id number]       YOB: 1955  ADMISSION DATE: FAMILY HISTORY:  Father has Parkinson disease. A paternal grandmother had stomach cancer.     SOCIAL HISTORY:  Patient is a nonsmoker. Drinks one alcoholic beverage per week. No illicit drug use.   She is employed full time.     REVIEW OF SYSTEMS:  Posit Lab  11/11/17   0654   RBC  2.83*   HGB  8.7*   HCT  26.0*   MCV  91.7   MCH  30.7   MCHC  33.5   RDW  13.3   WBC  14.6*   PLT  467*       IVF BOLUS  D5LR @ 125ML/HR  MORPHINE PCA  ZOFRAN 4MG IV    11/12    Filed: 11/12/2017  9:15 AM Date of Service: 11/12 VTE Prophylaxis Ordered: yes     Roy Catheter Information  Does patient have a roy catheter: no  Has the ory catheter been removed: Yes      Other reason for insertion/continuing: n/a      Gastrointestinal: normal appearance  normal bowel sounds  Inc
